# Patient Record
Sex: FEMALE | Race: BLACK OR AFRICAN AMERICAN
[De-identification: names, ages, dates, MRNs, and addresses within clinical notes are randomized per-mention and may not be internally consistent; named-entity substitution may affect disease eponyms.]

---

## 2019-11-30 ENCOUNTER — HOSPITAL ENCOUNTER (EMERGENCY)
Dept: HOSPITAL 72 - EMR | Age: 24
Discharge: HOME | End: 2019-11-30
Payer: COMMERCIAL

## 2019-11-30 VITALS — DIASTOLIC BLOOD PRESSURE: 80 MMHG | SYSTOLIC BLOOD PRESSURE: 110 MMHG

## 2019-11-30 VITALS — BODY MASS INDEX: 32.49 KG/M2 | HEIGHT: 67 IN | WEIGHT: 207 LBS

## 2019-11-30 VITALS — SYSTOLIC BLOOD PRESSURE: 109 MMHG | DIASTOLIC BLOOD PRESSURE: 76 MMHG

## 2019-11-30 DIAGNOSIS — N39.0: Primary | ICD-10-CM

## 2019-11-30 LAB
ADD MANUAL DIFF: NO
ALBUMIN SERPL-MCNC: 3.5 G/DL (ref 3.4–5)
ALBUMIN/GLOB SERPL: 0.9 {RATIO} (ref 1–2.7)
ALP SERPL-CCNC: 47 U/L (ref 46–116)
ALT SERPL-CCNC: 20 U/L (ref 12–78)
ANION GAP SERPL CALC-SCNC: 4 MMOL/L (ref 5–15)
APPEARANCE UR: (no result)
APTT PPP: YELLOW S
AST SERPL-CCNC: 15 U/L (ref 15–37)
BASOPHILS NFR BLD AUTO: 1.3 % (ref 0–2)
BILIRUB SERPL-MCNC: 0.9 MG/DL (ref 0.2–1)
BUN SERPL-MCNC: 7 MG/DL (ref 7–18)
CALCIUM SERPL-MCNC: 8 MG/DL (ref 8.5–10.1)
CHLORIDE SERPL-SCNC: 102 MMOL/L (ref 98–107)
CO2 SERPL-SCNC: 33 MMOL/L (ref 21–32)
CREAT SERPL-MCNC: 0.9 MG/DL (ref 0.55–1.3)
EOSINOPHIL NFR BLD AUTO: 0.9 % (ref 0–3)
ERYTHROCYTE [DISTWIDTH] IN BLOOD BY AUTOMATED COUNT: 10.7 % (ref 11.6–14.8)
GLOBULIN SER-MCNC: 3.8 G/DL
GLUCOSE UR STRIP-MCNC: NEGATIVE MG/DL
HCT VFR BLD CALC: 40.2 % (ref 37–47)
HGB BLD-MCNC: 13.2 G/DL (ref 12–16)
KETONES UR QL STRIP: NEGATIVE
LEUKOCYTE ESTERASE UR QL STRIP: (no result)
LYMPHOCYTES NFR BLD AUTO: 13.3 % (ref 20–45)
MCV RBC AUTO: 78 FL (ref 80–99)
MONOCYTES NFR BLD AUTO: 7 % (ref 1–10)
NEUTROPHILS NFR BLD AUTO: 77.6 % (ref 45–75)
NITRITE UR QL STRIP: NEGATIVE
PH UR STRIP: 5 [PH] (ref 4.5–8)
PLATELET # BLD: 195 K/UL (ref 150–450)
POTASSIUM SERPL-SCNC: 3.5 MMOL/L (ref 3.5–5.1)
PROT UR QL STRIP: (no result)
RBC # BLD AUTO: 5.16 M/UL (ref 4.2–5.4)
SODIUM SERPL-SCNC: 139 MMOL/L (ref 136–145)
SP GR UR STRIP: 1.01 (ref 1–1.03)
UROBILINOGEN UR-MCNC: 1 MG/DL (ref 0–1)
WBC # BLD AUTO: 6.2 K/UL (ref 4.8–10.8)

## 2019-11-30 PROCEDURE — 87086 URINE CULTURE/COLONY COUNT: CPT

## 2019-11-30 PROCEDURE — 96360 HYDRATION IV INFUSION INIT: CPT

## 2019-11-30 PROCEDURE — 81003 URINALYSIS AUTO W/O SCOPE: CPT

## 2019-11-30 PROCEDURE — 99284 EMERGENCY DEPT VISIT MOD MDM: CPT

## 2019-11-30 PROCEDURE — 81025 URINE PREGNANCY TEST: CPT

## 2019-11-30 PROCEDURE — 86710 INFLUENZA VIRUS ANTIBODY: CPT

## 2019-11-30 PROCEDURE — 85025 COMPLETE CBC W/AUTO DIFF WBC: CPT

## 2019-11-30 PROCEDURE — 80053 COMPREHEN METABOLIC PANEL: CPT

## 2019-11-30 PROCEDURE — 36415 COLL VENOUS BLD VENIPUNCTURE: CPT

## 2019-11-30 NOTE — EMERGENCY ROOM REPORT
History of Present Illness


General


Chief Complaint:  Abdominal Pain


Source:  Patient





Present Illness


HPI


24-year-old female presents ED for evaluation.  Complaining of generalized body 

aches and chills x1 day.  Febrile in triage.  Notes nausea, denies vomiting.  

Notes mild cough.  Dry.  Denies sick contacts or recent travel.  Did not 

receive flu vaccine this year.  No other aggravating relieving factors.  Denies 

any other associated symptoms


Allergies:  


Coded Allergies:  


     No Known Allergies (Unverified , 11/30/19)





Patient History


Past Medical History:  asthma


Past Surgical History:  none


Pertinent Family History:  none


Social History:  Denies: smoking, alcohol use, drug use


Last Menstrual Period:  11/21/19


Pregnant Now:  No


Immunizations:  UTD


Reviewed Nursing Documentation:  PMH: Agreed; PSxH: Agreed





Nursing Documentation-PMH


Past Medical History:  No History, Except For


Hx Cardiac Problems:  Yes - Anemia


Hx Asthma:  Yes





Review of Systems


All Other Systems:  negative except mentioned in HPI





Physical Exam





Vital Signs








  Date Time  Temp Pulse Resp B/P (MAP) Pulse Ox O2 Delivery O2 Flow Rate FiO2


 


11/30/19 19:41 100.4 119 18 109/76 (87) 97 Room Air  








Sp02 EP Interpretation:  reviewed, normal


General Appearance:  no apparent distress, alert, GCS 15, non-toxic


Head:  normocephalic, atraumatic


Eyes:  bilateral eye normal inspection, bilateral eye PERRL


ENT:  hearing grossly normal, normal pharynx, no angioedema, normal voice


Neck:  full range of motion, supple/symm/no masses


Respiratory:  chest non-tender, lungs clear, normal breath sounds, speaking 

full sentences


Cardiovascular #1:  regular rate, rhythm, no edema


Cardiovascular #2:  2+ carotid (R), 2+ carotid (L), 2+ radial (R), 2+ radial (L)

, 2+ dorsalis pedis (R), 2+ dorsalis pedis (L)


Gastrointestinal:  normal bowel sounds, non tender, soft, non-distended, no 

guarding, no rebound


Rectal:  deferred


Genitourinary:  normal inspection, no CVA tenderness


Musculoskeletal:  back normal, normal range of motion, gait/station normal, non-

tender


Neurologic:  alert, motor strength/tone normal, oriented x3, sensory intact, 

responsive, speech normal


Psychiatric:  judgement/insight normal, memory normal, mood/affect normal, no 

suicidal/homicidal ideation


Reflexes:  3+ bicep (R), 3+ bicep (L), 3+ tricep (R), 3+ tricep (L), 3+ knee (R)

, 3+ knee (L)


Lymphatic:  no adenopathy





Medical Decision Making


Diagnostic Impression:  


 Primary Impression:  


 UTI (urinary tract infection)


 Qualified Codes:  N39.0 - Urinary tract infection, site not specified


ER Course


Hospital Course 


23 yo F presents to ED c/o bodyaches, fever. 





Differential diagnoses include: UTI, viral syndrome, influenza 





Clinical course


Patient placed on stretcher.  After initial history and physical I ordered UA, 

labs, IV fluids, tylenol, flu swab 





labs - no leukocytosis, hbhematocrit stable, electrolytes okay, UA grossly 

positive. flu swab negative





I discussed findings with patient.  Vitals stable.  No leukocytosis.  Nontoxic-

appearing.  Will discharge to home.  Given Keflex in the ED.  States she has a 

PMD





Diagnosis -UTI 





Stable and discharged home with prescriptions for Rx Motrin, keflex.  Followup 

with PMD.  Return to ED if symptoms recur or worsen





Labs








Test


  11/30/19


20:10 11/30/19


20:19


 


Urine Color Yellow  


 


Urine Appearance


  Slightly


cloudy 


 


 


Urine pH 5 (4.5-8.0)  


 


Urine Specific Gravity


  1.015


(1.005-1.035) 


 


 


Urine Protein 1+ (NEGATIVE)  


 


Urine Glucose (UA)


  Negative


(NEGATIVE) 


 


 


Urine Ketones


  Negative


(NEGATIVE) 


 


 


Urine Blood 1+ (NEGATIVE)  


 


Urine Nitrite


  Negative


(NEGATIVE) 


 


 


Urine Bilirubin 1+ (NEGATIVE)  


 


Urine Ictotest


  Negative


(NEGATIVE) 


 


 


Urine Urobilinogen


  1 MG/DL


(0.0-1.0) 


 


 


Urine Leukocyte Esterase 1+ (NEGATIVE)  


 


Urine RBC


  2-4 /HPF (0 -


2) 


 


 


Urine WBC


  15-20 /HPF (0


- 2) 


 


 


Urine Squamous Epithelial


Cells Moderate /LPF


(NONE/OCC) 


 


 


Urine Bacteria


  Moderate /HPF


(NONE) 


 


 


Urine HCG, Qualitative


  Negative


(NEGATIVE) 


 


 


White Blood Count


  


  6.2 K/UL


(4.8-10.8)


 


Red Blood Count


  


  5.16 M/UL


(4.20-5.40)


 


Hemoglobin


  


  13.2 G/DL


(12.0-16.0)


 


Hematocrit


  


  40.2 %


(37.0-47.0)


 


Mean Corpuscular Volume  78 FL (80-99) 


 


Mean Corpuscular Hemoglobin


  


  25.6 PG


(27.0-31.0)


 


Mean Corpuscular Hemoglobin


Concent 


  32.9 G/DL


(32.0-36.0)


 


Red Cell Distribution Width


  


  10.7 %


(11.6-14.8)


 


Platelet Count


  


  195 K/UL


(150-450)


 


Mean Platelet Volume


  


  7.1 FL


(6.5-10.1)


 


Neutrophils (%) (Auto)


  


  77.6 %


(45.0-75.0)


 


Lymphocytes (%) (Auto)


  


  13.3 %


(20.0-45.0)


 


Monocytes (%) (Auto)


  


  7.0 %


(1.0-10.0)


 


Eosinophils (%) (Auto)


  


  0.9 %


(0.0-3.0)


 


Basophils (%) (Auto)


  


  1.3 %


(0.0-2.0)


 


Sodium Level


  


  139 MMOL/L


(136-145)


 


Potassium Level


  


  3.5 MMOL/L


(3.5-5.1)


 


Chloride Level


  


  102 MMOL/L


()


 


Carbon Dioxide Level


  


  33 MMOL/L


(21-32)


 


Anion Gap


  


  4 mmol/L


(5-15)


 


Blood Urea Nitrogen  7 mg/dL (7-18) 


 


Creatinine


  


  0.9 MG/DL


(0.55-1.30)


 


Estimat Glomerular Filtration


Rate 


  > 60 mL/min


(>60)


 


Glucose Level


  


  89 MG/DL


()


 


Calcium Level


  


  8.0 MG/DL


(8.5-10.1)


 


Total Bilirubin


  


  0.9 MG/DL


(0.2-1.0)


 


Aspartate Amino Transf


(AST/SGOT) 


  15 U/L (15-37) 


 


 


Alanine Aminotransferase


(ALT/SGPT) 


  20 U/L (12-78) 


 


 


Alkaline Phosphatase


  


  47 U/L


()


 


Total Protein


  


  7.3 G/DL


(6.4-8.2)


 


Albumin


  


  3.5 G/DL


(3.4-5.0)


 


Globulin  3.8 g/dL 


 


Albumin/Globulin Ratio  0.9 (1.0-2.7) 











Last Vital Signs








  Date Time  Temp Pulse Resp B/P (MAP) Pulse Ox O2 Delivery O2 Flow Rate FiO2


 


11/30/19 21:16 99.6 98 19 110/80 100 Room Air  








Status:  improved


Disposition:  HOME, SELF-CARE


Condition:  Stable


Scripts


Ondansetron Odt* (ZOFRAN ODT*) 4 Mg Tab.rapdis


4 MG BC EVERY 6 HOURS PRN for Nausea & Vomiting, #10 TAB 0 Refills


   Prov: Nico Solo MD         11/30/19 


Cephalexin* (KEFLEX*) 500 Mg Capsule


500 MG ORAL EVERY 6 HOURS for 7 Days, #28 CAP


   Prov: Nico Solo MD         11/30/19 


Ibuprofen* (MOTRIN*) 600 Mg Tablet


600 MG ORAL Q8H PRN for For Pain, #30 TAB 0 Refills


   Prov: Nico Solo MD         11/30/19


Patient Instructions:  Urinary Tract Infection, Easy-to-Read











Nico Solo MD Nov 30, 2019 22:12

## 2023-09-28 ENCOUNTER — HOSPITAL ENCOUNTER (EMERGENCY)
Dept: HOSPITAL 87 - ER | Age: 28
Discharge: HOME | End: 2023-09-28
Payer: MEDICAID

## 2023-09-28 VITALS
RESPIRATION RATE: 20 BRPM | TEMPERATURE: 97.8 F | DIASTOLIC BLOOD PRESSURE: 74 MMHG | HEART RATE: 79 BPM | SYSTOLIC BLOOD PRESSURE: 112 MMHG

## 2023-09-28 VITALS — BODY MASS INDEX: 25.95 KG/M2 | HEIGHT: 67 IN | WEIGHT: 165.35 LBS

## 2023-09-28 VITALS — OXYGEN SATURATION: 100 %
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
## 2023-09-28 DIAGNOSIS — S20.212A: ICD-10-CM

## 2023-09-28 DIAGNOSIS — D64.9: ICD-10-CM

## 2023-09-28 DIAGNOSIS — J45.909: ICD-10-CM
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
## 2023-09-28 DIAGNOSIS — S50.02XA: Primary | ICD-10-CM

## 2023-09-28 PROCEDURE — 81025 URINE PREGNANCY TEST: CPT

## 2023-09-28 PROCEDURE — 99284 EMERGENCY DEPT VISIT MOD MDM: CPT

## 2023-09-28 PROCEDURE — 71045 X-RAY EXAM CHEST 1 VIEW: CPT

## 2023-09-28 PROCEDURE — 96372 THER/PROPH/DIAG INJ SC/IM: CPT

## 2023-09-28 PROCEDURE — 73080 X-RAY EXAM OF ELBOW: CPT

## 2023-11-13 ENCOUNTER — HOSPITAL ENCOUNTER (EMERGENCY)
Dept: HOSPITAL 87 - ER | Age: 28
Discharge: HOME | End: 2023-11-13
Payer: MEDICAID

## 2023-11-13 VITALS
DIASTOLIC BLOOD PRESSURE: 72 MMHG | OXYGEN SATURATION: 100 % | HEART RATE: 90 BPM | RESPIRATION RATE: 16 BRPM | SYSTOLIC BLOOD PRESSURE: 126 MMHG | TEMPERATURE: 97.8 F

## 2023-11-13 VITALS — WEIGHT: 154.76 LBS | HEIGHT: 66 IN | BODY MASS INDEX: 24.87 KG/M2

## 2023-11-13 DIAGNOSIS — B34.9: Primary | ICD-10-CM

## 2023-11-13 DIAGNOSIS — J45.909: ICD-10-CM

## 2023-11-13 LAB
ALBUMIN SERPL BCP-MCNC: 3.6 G/DL (ref 3.4–5)
ALT SERPL W P-5'-P-CCNC: 16 IU/L (ref 13–61)
APPEARANCE UR: (no result)
AST SERPL W P-5'-P-CCNC: 11 IU/L (ref 15–37)
BACTERIA #/AREA URNS AUTO: (no result) /[HPF]
BASOPHILS NFR BLD AUTO: 0.7 % (ref 0–2)
BILIRUB SERPL-MCNC: 0.7 MG/DL (ref 0.1–1)
BUN SERPL-MCNC: 4 MG/DL (ref 7–21)
CALCIUM SERPL-MCNC: 8.8 MG/DL (ref 8.5–10.1)
CHLORIDE SERPL-SCNC: 107 MEQ/L (ref 98–107)
CO2 SERPL-SCNC: 30 MEQ/L (ref 21–32)
COLOR UR: YELLOW
CREAT SERPL-MCNC: 0.7 MG/DL (ref 0.6–1.3)
EOSINOPHIL NFR BLD AUTO: 2.4 % (ref 0–5)
ERYTHROCYTE [DISTWIDTH] IN BLOOD BY AUTOMATED COUNT: 16.6 % (ref 11.6–14.6)
GLUCOSE SERPL-MCNC: 80 MG/DL (ref 70–105)
GLUCOSE UR STRIP.AUTO-MCNC: NEGATIVE MG/DL
HCG SERPL QL: NEGATIVE
HCT VFR BLD AUTO: 40.5 % (ref 36–48)
HEMOLYSIS INTERF INDEX SERPL-ACNC: 1 (ref 1–3)
HGB BLD-MCNC: 13.1 G/DL (ref 12–16)
HGB UR QL STRIP: NEGATIVE
ICTERIC INTERF INDEX SERPL-ACNC: 1 (ref 1–4)
KETONES UR STRIP-MCNC: NEGATIVE MG/DL
LEUKOCYTE ESTERASE UR QL STRIP: NEGATIVE
LIPEMIC INTERF INDEX SERPL-ACNC: 1 (ref 1–3)
LYMPHOCYTES NFR BLD AUTO: 37.8 % (ref 20–50)
MANUAL DIF COMMENT BLD-IMP: 0
MCH RBC QN AUTO: 25.5 PG (ref 28–32)
MCHC RBC AUTO-ENTMCNC: 32.4 G/DL (ref 31–37)
MCV RBC AUTO: 78.9 FL (ref 81–99)
MONOCYTES NFR BLD AUTO: 6.6 % (ref 2–8)
NEUTROPHILS NFR BLD AUTO: 52.5 % (ref 40–76)
NITRITE UR QL STRIP: NEGATIVE
PH UR STRIP: 6.5 [PH] (ref 4.5–8)
PLATELET # BLD AUTO: 267 X1000/UL (ref 130–400)
PMV BLD AUTO: 9 FL (ref 7.4–10.4)
POTASSIUM SERPL-SCNC: 4 MEQ/L (ref 3.5–5.1)
PROT SERPL-MCNC: 7.6 G/DL (ref 6–8.3)
PROT UR QL STRIP: NEGATIVE
RBC # BLD AUTO: 5.14 MILL/UL (ref 4.2–5.4)
RBC #/AREA URNS AUTO: (no result) /HPF (ref 0–2)
SODIUM SERPL-SCNC: 140 MEQ/L (ref 136–145)
SP GR UR STRIP: 1.02 (ref 1–1.03)
SQUAMOUS #/AREA URNS AUTO: (no result) /LPF
UROBILINOGEN UR STRIP-MCNC: 0.2 E.U./DL (ref 0.2–1)
WBC # BLD: 5.4 X1000/UL (ref 4.5–11)
WBC #/AREA URNS AUTO: (no result) /HPF (ref 0–2)
YEAST UR QL AUTO: (no result)

## 2023-11-13 PROCEDURE — 84703 CHORIONIC GONADOTROPIN ASSAY: CPT

## 2023-11-13 PROCEDURE — 81003 URINALYSIS AUTO W/O SCOPE: CPT

## 2023-11-13 PROCEDURE — 80053 COMPREHEN METABOLIC PANEL: CPT

## 2023-11-13 PROCEDURE — 85025 COMPLETE CBC W/AUTO DIFF WBC: CPT

## 2023-11-13 PROCEDURE — 99283 EMERGENCY DEPT VISIT LOW MDM: CPT

## 2023-11-13 PROCEDURE — 36415 COLL VENOUS BLD VENIPUNCTURE: CPT
